# Patient Record
Sex: FEMALE | Race: OTHER | HISPANIC OR LATINO | URBAN - METROPOLITAN AREA
[De-identification: names, ages, dates, MRNs, and addresses within clinical notes are randomized per-mention and may not be internally consistent; named-entity substitution may affect disease eponyms.]

---

## 2017-11-06 ENCOUNTER — INPATIENT (INPATIENT)
Facility: HOSPITAL | Age: 32
LOS: 2 days | Discharge: HOME | End: 2017-11-09
Attending: OBSTETRICS & GYNECOLOGY | Admitting: OBSTETRICS & GYNECOLOGY
Payer: MEDICAID

## 2017-11-06 VITALS — WEIGHT: 160.94 LBS | HEIGHT: 59 IN

## 2017-11-06 DIAGNOSIS — O26.899 OTHER SPECIFIED PREGNANCY RELATED CONDITIONS, UNSPECIFIED TRIMESTER: ICD-10-CM

## 2017-11-06 DIAGNOSIS — Z3A.00 WEEKS OF GESTATION OF PREGNANCY NOT SPECIFIED: ICD-10-CM

## 2017-11-06 LAB
ABO RH CONFIRMATION: SIGNIFICANT CHANGE UP
APTT BLD: 25 SEC — LOW (ref 27.5–37.4)
BASOPHILS # BLD AUTO: 0 K/UL — SIGNIFICANT CHANGE UP (ref 0–0.2)
BASOPHILS # BLD AUTO: 0 K/UL — SIGNIFICANT CHANGE UP (ref 0–0.2)
BASOPHILS # BLD AUTO: 0.1 K/UL — SIGNIFICANT CHANGE UP (ref 0–0.2)
BASOPHILS NFR BLD AUTO: 0.3 % — SIGNIFICANT CHANGE UP (ref 0–2)
BASOPHILS NFR BLD AUTO: 0.5 % — SIGNIFICANT CHANGE UP (ref 0–2)
BASOPHILS NFR BLD AUTO: 0.5 % — SIGNIFICANT CHANGE UP (ref 0–2)
EOSINOPHIL # BLD AUTO: 0 K/UL — SIGNIFICANT CHANGE UP (ref 0–0.5)
EOSINOPHIL # BLD AUTO: 0 K/UL — SIGNIFICANT CHANGE UP (ref 0–0.5)
EOSINOPHIL # BLD AUTO: 0.1 K/UL — SIGNIFICANT CHANGE UP (ref 0–0.5)
EOSINOPHIL NFR BLD AUTO: 0.1 % — SIGNIFICANT CHANGE UP (ref 0–6)
EOSINOPHIL NFR BLD AUTO: 0.2 % — SIGNIFICANT CHANGE UP (ref 0–6)
EOSINOPHIL NFR BLD AUTO: 0.8 % — SIGNIFICANT CHANGE UP (ref 0–6)
FIBRINOGEN PPP-MCNC: 507 MG/DL — SIGNIFICANT CHANGE UP (ref 310–510)
HBV SURFACE AG SERPL QL IA: SIGNIFICANT CHANGE UP
HCT VFR BLD CALC: 27.8 % — LOW (ref 34.5–45)
HCT VFR BLD CALC: 33.6 % — LOW (ref 34.5–45)
HCT VFR BLD CALC: 38.6 % — SIGNIFICANT CHANGE UP (ref 34.5–45)
HGB BLD-MCNC: 10.7 G/DL — LOW (ref 11.5–15.5)
HGB BLD-MCNC: 12.1 G/DL — SIGNIFICANT CHANGE UP (ref 11.5–15.5)
HGB BLD-MCNC: 9.1 G/DL — LOW (ref 11.5–15.5)
HIV 1 & 2 AB SERPL IA.RAPID: SIGNIFICANT CHANGE UP
HIV 1+2 AB+HIV1 P24 AG SERPL QL IA: SIGNIFICANT CHANGE UP
INR BLD: 0.98 RATIO — SIGNIFICANT CHANGE UP (ref 0.88–1.16)
LYMPHOCYTES # BLD AUTO: 1.2 K/UL — SIGNIFICANT CHANGE UP (ref 1–3.3)
LYMPHOCYTES # BLD AUTO: 1.7 K/UL — SIGNIFICANT CHANGE UP (ref 1–3.3)
LYMPHOCYTES # BLD AUTO: 1.8 K/UL — SIGNIFICANT CHANGE UP (ref 1–3.3)
LYMPHOCYTES # BLD AUTO: 14.3 % — SIGNIFICANT CHANGE UP (ref 13–44)
LYMPHOCYTES # BLD AUTO: 18.7 % — SIGNIFICANT CHANGE UP (ref 13–44)
LYMPHOCYTES # BLD AUTO: 7.2 % — LOW (ref 13–44)
MCHC RBC-ENTMCNC: 29.7 PG — SIGNIFICANT CHANGE UP (ref 27–34)
MCHC RBC-ENTMCNC: 30.4 PG — SIGNIFICANT CHANGE UP (ref 27–34)
MCHC RBC-ENTMCNC: 30.5 PG — SIGNIFICANT CHANGE UP (ref 27–34)
MCHC RBC-ENTMCNC: 31.4 GM/DL — LOW (ref 32–36)
MCHC RBC-ENTMCNC: 31.8 GM/DL — LOW (ref 32–36)
MCHC RBC-ENTMCNC: 32.6 GM/DL — SIGNIFICANT CHANGE UP (ref 32–36)
MCV RBC AUTO: 93.4 FL — SIGNIFICANT CHANGE UP (ref 80–100)
MCV RBC AUTO: 94.6 FL — SIGNIFICANT CHANGE UP (ref 80–100)
MCV RBC AUTO: 95.8 FL — SIGNIFICANT CHANGE UP (ref 80–100)
MONOCYTES # BLD AUTO: 0.5 K/UL — SIGNIFICANT CHANGE UP (ref 0–0.9)
MONOCYTES NFR BLD AUTO: 3.1 % — SIGNIFICANT CHANGE UP (ref 2–14)
MONOCYTES NFR BLD AUTO: 4.3 % — SIGNIFICANT CHANGE UP (ref 2–14)
MONOCYTES NFR BLD AUTO: 5.8 % — SIGNIFICANT CHANGE UP (ref 2–14)
NEUTROPHILS # BLD AUTO: 14.4 K/UL — HIGH (ref 1.8–7.4)
NEUTROPHILS # BLD AUTO: 7 K/UL — SIGNIFICANT CHANGE UP (ref 1.8–7.4)
NEUTROPHILS # BLD AUTO: 9.9 K/UL — HIGH (ref 1.8–7.4)
NEUTROPHILS NFR BLD AUTO: 74.3 % — SIGNIFICANT CHANGE UP (ref 43–77)
NEUTROPHILS NFR BLD AUTO: 80.7 % — HIGH (ref 43–77)
NEUTROPHILS NFR BLD AUTO: 89.3 % — HIGH (ref 43–77)
PLATELET # BLD AUTO: 209 K/UL — SIGNIFICANT CHANGE UP (ref 150–400)
PLATELET # BLD AUTO: 218 K/UL — SIGNIFICANT CHANGE UP (ref 150–400)
PLATELET # BLD AUTO: 234 K/UL — SIGNIFICANT CHANGE UP (ref 150–400)
PROTHROM AB SERPL-ACNC: 10.7 SEC — SIGNIFICANT CHANGE UP (ref 9.8–12.7)
RBC # BLD: 2.98 M/UL — LOW (ref 3.8–5.2)
RBC # BLD: 3.51 M/UL — LOW (ref 3.8–5.2)
RBC # BLD: 4.08 M/UL — SIGNIFICANT CHANGE UP (ref 3.8–5.2)
RBC # FLD: 13 % — SIGNIFICANT CHANGE UP (ref 10.3–14.5)
RBC # FLD: 13.1 % — SIGNIFICANT CHANGE UP (ref 10.3–14.5)
RBC # FLD: 13.7 % — SIGNIFICANT CHANGE UP (ref 10.3–14.5)
RUBV IGG SER-ACNC: 2.4 INDEX — SIGNIFICANT CHANGE UP
RUBV IGG SER-IMP: POSITIVE — SIGNIFICANT CHANGE UP
T PALLIDUM AB TITR SER: NEGATIVE — SIGNIFICANT CHANGE UP
WBC # BLD: 12.2 K/UL — HIGH (ref 3.8–10.5)
WBC # BLD: 16.2 K/UL — HIGH (ref 3.8–10.5)
WBC # BLD: 9.4 K/UL — SIGNIFICANT CHANGE UP (ref 3.8–10.5)
WBC # FLD AUTO: 12.2 K/UL — HIGH (ref 3.8–10.5)
WBC # FLD AUTO: 16.2 K/UL — HIGH (ref 3.8–10.5)
WBC # FLD AUTO: 9.4 K/UL — SIGNIFICANT CHANGE UP (ref 3.8–10.5)

## 2017-11-06 PROCEDURE — 59514 CESAREAN DELIVERY ONLY: CPT | Mod: U9

## 2017-11-06 PROCEDURE — 88307 TISSUE EXAM BY PATHOLOGIST: CPT | Mod: 26

## 2017-11-06 PROCEDURE — 88302 TISSUE EXAM BY PATHOLOGIST: CPT | Mod: 26

## 2017-11-06 PROCEDURE — 58611 LIGATE OVIDUCT(S) ADD-ON: CPT

## 2017-11-06 RX ORDER — DIPHENHYDRAMINE HCL 50 MG
25 CAPSULE ORAL EVERY 6 HOURS
Qty: 0 | Refills: 0 | Status: DISCONTINUED | OUTPATIENT
Start: 2017-11-06 | End: 2017-11-09

## 2017-11-06 RX ORDER — AMPICILLIN TRIHYDRATE 250 MG
1 CAPSULE ORAL EVERY 6 HOURS
Qty: 0 | Refills: 0 | Status: DISCONTINUED | OUTPATIENT
Start: 2017-11-06 | End: 2017-11-07

## 2017-11-06 RX ORDER — SODIUM CHLORIDE 9 MG/ML
1000 INJECTION, SOLUTION INTRAVENOUS
Qty: 0 | Refills: 0 | Status: DISCONTINUED | OUTPATIENT
Start: 2017-11-06 | End: 2017-11-09

## 2017-11-06 RX ORDER — GENTAMICIN SULFATE 40 MG/ML
80 VIAL (ML) INJECTION ONCE
Qty: 0 | Refills: 0 | Status: COMPLETED | OUTPATIENT
Start: 2017-11-06 | End: 2017-11-06

## 2017-11-06 RX ORDER — OXYTOCIN 10 UNIT/ML
41.67 VIAL (ML) INJECTION
Qty: 20 | Refills: 0 | Status: DISCONTINUED | OUTPATIENT
Start: 2017-11-06 | End: 2017-11-09

## 2017-11-06 RX ORDER — AMPICILLIN TRIHYDRATE 250 MG
2 CAPSULE ORAL ONCE
Qty: 0 | Refills: 0 | Status: COMPLETED | OUTPATIENT
Start: 2017-11-06 | End: 2017-11-06

## 2017-11-06 RX ORDER — TETANUS TOXOID, REDUCED DIPHTHERIA TOXOID AND ACELLULAR PERTUSSIS VACCINE, ADSORBED 5; 2.5; 8; 8; 2.5 [IU]/.5ML; [IU]/.5ML; UG/.5ML; UG/.5ML; UG/.5ML
0.5 SUSPENSION INTRAMUSCULAR ONCE
Qty: 0 | Refills: 0 | Status: DISCONTINUED | OUTPATIENT
Start: 2017-11-06 | End: 2017-11-09

## 2017-11-06 RX ORDER — SODIUM CHLORIDE 9 MG/ML
1000 INJECTION, SOLUTION INTRAVENOUS ONCE
Qty: 0 | Refills: 0 | Status: DISCONTINUED | OUTPATIENT
Start: 2017-11-06 | End: 2017-11-09

## 2017-11-06 RX ORDER — GENTAMICIN SULFATE 40 MG/ML
VIAL (ML) INJECTION
Qty: 0 | Refills: 0 | Status: DISCONTINUED | OUTPATIENT
Start: 2017-11-06 | End: 2017-11-07

## 2017-11-06 RX ORDER — GLYCERIN ADULT
1 SUPPOSITORY, RECTAL RECTAL AT BEDTIME
Qty: 0 | Refills: 0 | Status: DISCONTINUED | OUTPATIENT
Start: 2017-11-06 | End: 2017-11-09

## 2017-11-06 RX ORDER — CITRIC ACID/SODIUM CITRATE 300-500 MG
30 SOLUTION, ORAL ORAL ONCE
Qty: 0 | Refills: 0 | Status: COMPLETED | OUTPATIENT
Start: 2017-11-06 | End: 2017-11-06

## 2017-11-06 RX ORDER — MORPHINE SULFATE 50 MG/1
4 CAPSULE, EXTENDED RELEASE ORAL EVERY 4 HOURS
Qty: 0 | Refills: 0 | Status: DISCONTINUED | OUTPATIENT
Start: 2017-11-06 | End: 2017-11-08

## 2017-11-06 RX ORDER — FERROUS SULFATE 325(65) MG
325 TABLET ORAL DAILY
Qty: 0 | Refills: 0 | Status: DISCONTINUED | OUTPATIENT
Start: 2017-11-06 | End: 2017-11-07

## 2017-11-06 RX ORDER — KETOROLAC TROMETHAMINE 30 MG/ML
30 SYRINGE (ML) INJECTION EVERY 6 HOURS
Qty: 0 | Refills: 0 | Status: DISCONTINUED | OUTPATIENT
Start: 2017-11-06 | End: 2017-11-08

## 2017-11-06 RX ORDER — DOCUSATE SODIUM 100 MG
100 CAPSULE ORAL
Qty: 0 | Refills: 0 | Status: DISCONTINUED | OUTPATIENT
Start: 2017-11-06 | End: 2017-11-09

## 2017-11-06 RX ORDER — ENOXAPARIN SODIUM 100 MG/ML
40 INJECTION SUBCUTANEOUS
Qty: 0 | Refills: 0 | Status: COMPLETED | OUTPATIENT
Start: 2017-11-06 | End: 2017-11-08

## 2017-11-06 RX ORDER — METOCLOPRAMIDE HCL 10 MG
10 TABLET ORAL ONCE
Qty: 0 | Refills: 0 | Status: DISCONTINUED | OUTPATIENT
Start: 2017-11-06 | End: 2017-11-09

## 2017-11-06 RX ORDER — LANOLIN
1 OINTMENT (GRAM) TOPICAL
Qty: 0 | Refills: 0 | Status: DISCONTINUED | OUTPATIENT
Start: 2017-11-06 | End: 2017-11-09

## 2017-11-06 RX ORDER — ACETAMINOPHEN 500 MG
1000 TABLET ORAL ONCE
Qty: 0 | Refills: 0 | Status: COMPLETED | OUTPATIENT
Start: 2017-11-06 | End: 2017-11-06

## 2017-11-06 RX ORDER — HYDROMORPHONE HYDROCHLORIDE 2 MG/ML
0.5 INJECTION INTRAMUSCULAR; INTRAVENOUS; SUBCUTANEOUS
Qty: 0 | Refills: 0 | Status: DISCONTINUED | OUTPATIENT
Start: 2017-11-06 | End: 2017-11-06

## 2017-11-06 RX ORDER — SIMETHICONE 80 MG/1
80 TABLET, CHEWABLE ORAL EVERY 4 HOURS
Qty: 0 | Refills: 0 | Status: DISCONTINUED | OUTPATIENT
Start: 2017-11-06 | End: 2017-11-09

## 2017-11-06 RX ORDER — AMPICILLIN TRIHYDRATE 250 MG
CAPSULE ORAL
Qty: 0 | Refills: 0 | Status: DISCONTINUED | OUTPATIENT
Start: 2017-11-06 | End: 2017-11-07

## 2017-11-06 RX ORDER — CEFAZOLIN SODIUM 1 G
2000 VIAL (EA) INJECTION ONCE
Qty: 0 | Refills: 0 | Status: COMPLETED | OUTPATIENT
Start: 2017-11-06 | End: 2017-11-06

## 2017-11-06 RX ORDER — GENTAMICIN SULFATE 40 MG/ML
80 VIAL (ML) INJECTION EVERY 8 HOURS
Qty: 0 | Refills: 0 | Status: DISCONTINUED | OUTPATIENT
Start: 2017-11-06 | End: 2017-11-07

## 2017-11-06 RX ORDER — ACETAMINOPHEN 500 MG
650 TABLET ORAL EVERY 6 HOURS
Qty: 0 | Refills: 0 | Status: DISCONTINUED | OUTPATIENT
Start: 2017-11-06 | End: 2017-11-09

## 2017-11-06 RX ADMIN — HYDROMORPHONE HYDROCHLORIDE 0.5 MILLIGRAM(S): 2 INJECTION INTRAMUSCULAR; INTRAVENOUS; SUBCUTANEOUS at 10:51

## 2017-11-06 RX ADMIN — Medication 400 MILLIGRAM(S): at 10:03

## 2017-11-06 RX ADMIN — Medication 30 MILLIGRAM(S): at 14:32

## 2017-11-06 RX ADMIN — Medication 100 MILLIGRAM(S): at 08:05

## 2017-11-06 RX ADMIN — HYDROMORPHONE HYDROCHLORIDE 0.5 MILLIGRAM(S): 2 INJECTION INTRAMUSCULAR; INTRAVENOUS; SUBCUTANEOUS at 09:59

## 2017-11-06 RX ADMIN — Medication 200 MILLIGRAM(S): at 10:06

## 2017-11-06 RX ADMIN — Medication 200 MILLIGRAM(S): at 18:46

## 2017-11-06 RX ADMIN — Medication 216 GRAM(S): at 12:19

## 2017-11-06 RX ADMIN — Medication 208 GRAM(S): at 18:06

## 2017-11-06 RX ADMIN — HYDROMORPHONE HYDROCHLORIDE 0.5 MILLIGRAM(S): 2 INJECTION INTRAMUSCULAR; INTRAVENOUS; SUBCUTANEOUS at 11:55

## 2017-11-06 RX ADMIN — Medication 100 MILLIGRAM(S): at 18:38

## 2017-11-06 RX ADMIN — HYDROMORPHONE HYDROCHLORIDE 0.5 MILLIGRAM(S): 2 INJECTION INTRAMUSCULAR; INTRAVENOUS; SUBCUTANEOUS at 11:12

## 2017-11-06 RX ADMIN — Medication 30 MILLIGRAM(S): at 23:20

## 2017-11-06 RX ADMIN — Medication 30 MILLIGRAM(S): at 22:52

## 2017-11-06 RX ADMIN — MORPHINE SULFATE 4 MILLIGRAM(S): 50 CAPSULE, EXTENDED RELEASE ORAL at 18:42

## 2017-11-06 RX ADMIN — HYDROMORPHONE HYDROCHLORIDE 0.5 MILLIGRAM(S): 2 INJECTION INTRAMUSCULAR; INTRAVENOUS; SUBCUTANEOUS at 10:52

## 2017-11-06 RX ADMIN — HYDROMORPHONE HYDROCHLORIDE 0.5 MILLIGRAM(S): 2 INJECTION INTRAMUSCULAR; INTRAVENOUS; SUBCUTANEOUS at 10:15

## 2017-11-06 RX ADMIN — HYDROMORPHONE HYDROCHLORIDE 0.5 MILLIGRAM(S): 2 INJECTION INTRAMUSCULAR; INTRAVENOUS; SUBCUTANEOUS at 10:26

## 2017-11-06 RX ADMIN — Medication 100 MILLIGRAM(S): at 11:08

## 2017-11-06 RX ADMIN — ENOXAPARIN SODIUM 40 MILLIGRAM(S): 100 INJECTION SUBCUTANEOUS at 21:22

## 2017-11-06 RX ADMIN — MORPHINE SULFATE 4 MILLIGRAM(S): 50 CAPSULE, EXTENDED RELEASE ORAL at 18:12

## 2017-11-06 RX ADMIN — Medication 125 MILLIUNIT(S)/MIN: at 09:37

## 2017-11-06 RX ADMIN — Medication 1000 MILLIGRAM(S): at 10:15

## 2017-11-06 RX ADMIN — Medication 30 MILLIGRAM(S): at 14:02

## 2017-11-07 DIAGNOSIS — D62 ACUTE POSTHEMORRHAGIC ANEMIA: ICD-10-CM

## 2017-11-07 LAB
BASOPHILS # BLD AUTO: 0 K/UL — SIGNIFICANT CHANGE UP (ref 0–0.2)
BASOPHILS NFR BLD AUTO: 0.2 % — SIGNIFICANT CHANGE UP (ref 0–2)
EOSINOPHIL # BLD AUTO: 0.1 K/UL — SIGNIFICANT CHANGE UP (ref 0–0.5)
EOSINOPHIL NFR BLD AUTO: 0.6 % — SIGNIFICANT CHANGE UP (ref 0–6)
HCT VFR BLD CALC: 26.6 % — LOW (ref 34.5–45)
HGB BLD-MCNC: 8.5 G/DL — LOW (ref 11.5–15.5)
LYMPHOCYTES # BLD AUTO: 1.5 K/UL — SIGNIFICANT CHANGE UP (ref 1–3.3)
LYMPHOCYTES # BLD AUTO: 11 % — LOW (ref 13–44)
MCHC RBC-ENTMCNC: 29.4 PG — SIGNIFICANT CHANGE UP (ref 27–34)
MCHC RBC-ENTMCNC: 32 GM/DL — SIGNIFICANT CHANGE UP (ref 32–36)
MCV RBC AUTO: 91.6 FL — SIGNIFICANT CHANGE UP (ref 80–100)
MONOCYTES # BLD AUTO: 0.7 K/UL — SIGNIFICANT CHANGE UP (ref 0–0.9)
MONOCYTES NFR BLD AUTO: 5.1 % — SIGNIFICANT CHANGE UP (ref 2–14)
NEUTROPHILS # BLD AUTO: 11.5 K/UL — HIGH (ref 1.8–7.4)
NEUTROPHILS NFR BLD AUTO: 83 % — HIGH (ref 43–77)
PLATELET # BLD AUTO: 220 K/UL — SIGNIFICANT CHANGE UP (ref 150–400)
RBC # BLD: 2.9 M/UL — LOW (ref 3.8–5.2)
RBC # FLD: 13 % — SIGNIFICANT CHANGE UP (ref 10.3–14.5)
WBC # BLD: 13.9 K/UL — HIGH (ref 3.8–10.5)
WBC # FLD AUTO: 13.9 K/UL — HIGH (ref 3.8–10.5)

## 2017-11-07 RX ORDER — ACETAMINOPHEN 500 MG
1000 TABLET ORAL ONCE
Qty: 0 | Refills: 0 | Status: COMPLETED | OUTPATIENT
Start: 2017-11-07 | End: 2017-11-07

## 2017-11-07 RX ORDER — IRON SUCROSE 20 MG/ML
200 INJECTION, SOLUTION INTRAVENOUS EVERY 24 HOURS
Qty: 0 | Refills: 0 | Status: COMPLETED | OUTPATIENT
Start: 2017-11-07 | End: 2017-11-09

## 2017-11-07 RX ADMIN — Medication 200 MILLIGRAM(S): at 02:03

## 2017-11-07 RX ADMIN — Medication 1000 MILLIGRAM(S): at 04:20

## 2017-11-07 RX ADMIN — Medication 100 MILLIGRAM(S): at 02:02

## 2017-11-07 RX ADMIN — Medication 30 MILLIGRAM(S): at 20:02

## 2017-11-07 RX ADMIN — Medication 30 MILLIGRAM(S): at 20:32

## 2017-11-07 RX ADMIN — Medication 30 MILLIGRAM(S): at 14:41

## 2017-11-07 RX ADMIN — Medication 30 MILLIGRAM(S): at 08:00

## 2017-11-07 RX ADMIN — SIMETHICONE 80 MILLIGRAM(S): 80 TABLET, CHEWABLE ORAL at 23:29

## 2017-11-07 RX ADMIN — Medication 100 MILLIGRAM(S): at 06:25

## 2017-11-07 RX ADMIN — IRON SUCROSE 110 MILLIGRAM(S): 20 INJECTION, SOLUTION INTRAVENOUS at 11:49

## 2017-11-07 RX ADMIN — Medication 208 GRAM(S): at 00:40

## 2017-11-07 RX ADMIN — ENOXAPARIN SODIUM 40 MILLIGRAM(S): 100 INJECTION SUBCUTANEOUS at 21:36

## 2017-11-07 RX ADMIN — SIMETHICONE 80 MILLIGRAM(S): 80 TABLET, CHEWABLE ORAL at 13:20

## 2017-11-07 RX ADMIN — SIMETHICONE 80 MILLIGRAM(S): 80 TABLET, CHEWABLE ORAL at 06:26

## 2017-11-07 RX ADMIN — Medication 1 TABLET(S): at 11:50

## 2017-11-07 RX ADMIN — Medication 30 MILLIGRAM(S): at 08:32

## 2017-11-07 RX ADMIN — Medication 100 MILLIGRAM(S): at 12:00

## 2017-11-07 RX ADMIN — Medication 100 MILLIGRAM(S): at 20:06

## 2017-11-07 RX ADMIN — Medication 208 GRAM(S): at 12:00

## 2017-11-07 RX ADMIN — Medication 200 MILLIGRAM(S): at 10:41

## 2017-11-07 RX ADMIN — Medication 30 MILLIGRAM(S): at 15:10

## 2017-11-07 RX ADMIN — Medication 400 MILLIGRAM(S): at 03:48

## 2017-11-07 NOTE — CHART NOTE - NSCHARTNOTEFT_GEN_A_CORE
Patient seen at bedside, comfortable, states she feeling 5/10 pain worse when getting up and walking, reports mild weakness but no dizziness/lightheadedness.   Denies n/v/d, heavy vaginal bleeding, abdominal pain or any other concerns  EKG: Sinus tachycardia   Orthostatics positive    Vital Signs Last 24 Hrs    T(C): 36.6 (07 Nov 2017 14:10), Max: 36.8 (07 Nov 2017 00:03)  T(F): 97.8 (07 Nov 2017 14:10), Max: 98.2 (07 Nov 2017 00:03)  HR: 112 (07 Nov 2017 14:10) (103 - 112)  BP: 122/61 (07 Nov 2017 14:10) (109/67 - 124/72)  RR: 17 (07 Nov 2017 14:10) (16 - 18)  SpO2: 97% (07 Nov 2017 14:10) (97% - 99%)    Discussed the need for blood transfusion given significant drop in Hgb, patient agrees to transfusion; consent signed    a/p : POD 1 s/p primary c/s with BTL with acute blood loss anemia   transfuse 2 units   monitor vital signs   cbc 4 hours post transfusion   continue close monitoring  asuncion kruse hleap, attending on call

## 2017-11-08 LAB
HCT VFR BLD CALC: 29 % — LOW (ref 34.5–45)
HGB BLD-MCNC: 9.5 G/DL — LOW (ref 11.5–15.5)
MCHC RBC-ENTMCNC: 30.9 PG — SIGNIFICANT CHANGE UP (ref 27–34)
MCHC RBC-ENTMCNC: 32.9 GM/DL — SIGNIFICANT CHANGE UP (ref 32–36)
MCV RBC AUTO: 93.8 FL — SIGNIFICANT CHANGE UP (ref 80–100)
PLATELET # BLD AUTO: 225 K/UL — SIGNIFICANT CHANGE UP (ref 150–400)
RBC # BLD: 3.09 M/UL — LOW (ref 3.8–5.2)
RBC # FLD: 14 % — SIGNIFICANT CHANGE UP (ref 10.3–14.5)
WBC # BLD: 16.5 K/UL — HIGH (ref 3.8–10.5)
WBC # FLD AUTO: 16.5 K/UL — HIGH (ref 3.8–10.5)

## 2017-11-08 RX ORDER — OXYCODONE AND ACETAMINOPHEN 5; 325 MG/1; MG/1
1 TABLET ORAL EVERY 6 HOURS
Qty: 0 | Refills: 0 | Status: DISCONTINUED | OUTPATIENT
Start: 2017-11-08 | End: 2017-11-09

## 2017-11-08 RX ORDER — IBUPROFEN 200 MG
600 TABLET ORAL EVERY 6 HOURS
Qty: 0 | Refills: 0 | Status: DISCONTINUED | OUTPATIENT
Start: 2017-11-08 | End: 2017-11-09

## 2017-11-08 RX ORDER — OXYCODONE AND ACETAMINOPHEN 5; 325 MG/1; MG/1
2 TABLET ORAL EVERY 6 HOURS
Qty: 0 | Refills: 0 | Status: DISCONTINUED | OUTPATIENT
Start: 2017-11-08 | End: 2017-11-09

## 2017-11-08 RX ADMIN — OXYCODONE AND ACETAMINOPHEN 1 TABLET(S): 5; 325 TABLET ORAL at 14:38

## 2017-11-08 RX ADMIN — Medication 100 MILLIGRAM(S): at 06:37

## 2017-11-08 RX ADMIN — Medication 1 TABLET(S): at 11:13

## 2017-11-08 RX ADMIN — SIMETHICONE 80 MILLIGRAM(S): 80 TABLET, CHEWABLE ORAL at 05:11

## 2017-11-08 RX ADMIN — ENOXAPARIN SODIUM 40 MILLIGRAM(S): 100 INJECTION SUBCUTANEOUS at 23:59

## 2017-11-08 RX ADMIN — SIMETHICONE 80 MILLIGRAM(S): 80 TABLET, CHEWABLE ORAL at 11:14

## 2017-11-08 RX ADMIN — IRON SUCROSE 110 MILLIGRAM(S): 20 INJECTION, SOLUTION INTRAVENOUS at 11:14

## 2017-11-08 RX ADMIN — Medication 30 MILLIGRAM(S): at 05:31

## 2017-11-08 RX ADMIN — Medication 100 MILLIGRAM(S): at 17:32

## 2017-11-08 RX ADMIN — Medication 30 MILLIGRAM(S): at 05:01

## 2017-11-08 RX ADMIN — OXYCODONE AND ACETAMINOPHEN 2 TABLET(S): 5; 325 TABLET ORAL at 22:00

## 2017-11-08 RX ADMIN — OXYCODONE AND ACETAMINOPHEN 2 TABLET(S): 5; 325 TABLET ORAL at 21:22

## 2017-11-08 RX ADMIN — OXYCODONE AND ACETAMINOPHEN 1 TABLET(S): 5; 325 TABLET ORAL at 15:10

## 2017-11-09 VITALS
DIASTOLIC BLOOD PRESSURE: 67 MMHG | OXYGEN SATURATION: 100 % | RESPIRATION RATE: 18 BRPM | HEART RATE: 98 BPM | TEMPERATURE: 98 F | SYSTOLIC BLOOD PRESSURE: 114 MMHG

## 2017-11-09 PROCEDURE — G0463: CPT

## 2017-11-09 PROCEDURE — 85384 FIBRINOGEN ACTIVITY: CPT

## 2017-11-09 PROCEDURE — P9059: CPT

## 2017-11-09 PROCEDURE — 36415 COLL VENOUS BLD VENIPUNCTURE: CPT

## 2017-11-09 PROCEDURE — 85730 THROMBOPLASTIN TIME PARTIAL: CPT

## 2017-11-09 PROCEDURE — 87340 HEPATITIS B SURFACE AG IA: CPT

## 2017-11-09 PROCEDURE — 88302 TISSUE EXAM BY PATHOLOGIST: CPT

## 2017-11-09 PROCEDURE — 86920 COMPATIBILITY TEST SPIN: CPT

## 2017-11-09 PROCEDURE — 88307 TISSUE EXAM BY PATHOLOGIST: CPT

## 2017-11-09 PROCEDURE — P9040: CPT

## 2017-11-09 PROCEDURE — 59050 FETAL MONITOR W/REPORT: CPT

## 2017-11-09 PROCEDURE — 86780 TREPONEMA PALLIDUM: CPT

## 2017-11-09 PROCEDURE — 85027 COMPLETE CBC AUTOMATED: CPT

## 2017-11-09 PROCEDURE — 86762 RUBELLA ANTIBODY: CPT

## 2017-11-09 PROCEDURE — 86703 HIV-1/HIV-2 1 RESULT ANTBDY: CPT

## 2017-11-09 PROCEDURE — 86850 RBC ANTIBODY SCREEN: CPT

## 2017-11-09 PROCEDURE — 86901 BLOOD TYPING SEROLOGIC RH(D): CPT

## 2017-11-09 PROCEDURE — 85610 PROTHROMBIN TIME: CPT

## 2017-11-09 PROCEDURE — 36430 TRANSFUSION BLD/BLD COMPNT: CPT

## 2017-11-09 PROCEDURE — 87389 HIV-1 AG W/HIV-1&-2 AB AG IA: CPT

## 2017-11-09 PROCEDURE — 93005 ELECTROCARDIOGRAM TRACING: CPT

## 2017-11-09 PROCEDURE — 59025 FETAL NON-STRESS TEST: CPT

## 2017-11-09 RX ORDER — FERROUS SULFATE 325(65) MG
1 TABLET ORAL
Qty: 60 | Refills: 0 | OUTPATIENT
Start: 2017-11-09 | End: 2017-12-09

## 2017-11-09 RX ORDER — IBUPROFEN 200 MG
1 TABLET ORAL
Qty: 20 | Refills: 0 | OUTPATIENT
Start: 2017-11-09 | End: 2017-11-14

## 2017-11-09 RX ORDER — DOCUSATE SODIUM 100 MG
1 CAPSULE ORAL
Qty: 40 | Refills: 0 | OUTPATIENT
Start: 2017-11-09 | End: 2017-11-29

## 2017-11-09 RX ORDER — SENNA PLUS 8.6 MG/1
2 TABLET ORAL
Qty: 20 | Refills: 0 | OUTPATIENT
Start: 2017-11-09 | End: 2017-11-19

## 2017-11-09 RX ORDER — SIMETHICONE 80 MG/1
1 TABLET, CHEWABLE ORAL
Qty: 30 | Refills: 0 | OUTPATIENT
Start: 2017-11-09 | End: 2017-11-14

## 2017-11-09 RX ADMIN — IRON SUCROSE 110 MILLIGRAM(S): 20 INJECTION, SOLUTION INTRAVENOUS at 12:30

## 2017-11-09 RX ADMIN — Medication 1 TABLET(S): at 12:58

## 2017-11-09 NOTE — DISCHARGE NOTE OB - MEDICATION SUMMARY - MEDICATIONS TO TAKE
I will START or STAY ON the medications listed below when I get home from the hospital:    ibuprofen 600 mg oral tablet  -- 1 tab(s) by mouth every 6 hours, As needed, mild pain  -- Indication: For Pain, as needed    FeroSul 325 mg (65 mg elemental iron) oral tablet  -- 1 tab(s) by mouth 2 times a day   -- Check with your doctor before becoming pregnant.  Do not chew, break, or crush.  May discolor urine or feces.    -- Indication: For anemia    senna 15 mg oral tablet  -- 2 tab(s) by mouth once a day (at bedtime)   -- Indication: For Constipation    docusate sodium 100 mg oral capsule  -- 1 cap(s) by mouth 2 times a day  -- Indication: For stool softener    simethicone 80 mg oral tablet, chewable  -- 1 tab(s) by mouth every 4 hours, As needed, Gas  -- Indication: For gas pain

## 2017-11-09 NOTE — PROGRESS NOTE ADULT - PROBLEM SELECTOR PROBLEM 1
delivery indicated due to breech presentation

## 2017-11-09 NOTE — PROGRESS NOTE ADULT - SUBJECTIVE AND OBJECTIVE BOX
Patient seen resting comfortably.  No new complaints. Reports incisional pain, controlled on pain medications.  Denies CP, SOB, N/V/D, dizziness, palpitations, Jerome removed in AM, due to void. Denies flatus or BM    Vital Signs Last 24 Hrs  T(C): 36.6 (07 Nov 2017 04:51), Max: 36.8 (06 Nov 2017 13:53)  T(F): 97.8 (07 Nov 2017 04:51), Max: 98.2 (06 Nov 2017 13:53)  HR: 103 (07 Nov 2017 04:51) (71 - 104)  BP: 116/62 (07 Nov 2017 04:51) (109/67 - 134/76)  BP(mean): 76 (06 Nov 2017 11:45) (72 - 88)  RR: 16 (07 Nov 2017 04:51) (11 - 18)  SpO2: 99% (07 Nov 2017 04:51) (97% - 100%)    Gen: A&O x 3, NAD  Chest: CTABL  Cardiac: S1+S2+ RRR  Breast: Soft, nontender, nonengorged  Abdomen: Nontender, nondistended, +BS, Incision c/d/i, no erythema  Gyn: lochia wnl  Extremities: Nontender, no worsening edema, venodynes in place                          8.5    13.9  )-----------( 220      ( 07 Nov 2017 06:33 )             26.6
Patient seen resting comfortably.  No new complaints. Reports incisional pain, controlled on pain medications.  Denies CP, SOB, N/V/D, dizziness, palpitations. Voiding spontaneously, denies dysuria, urgency or frequency. +Flatus, - BM. Tolerating regular diet.     Vital Signs Last 24 Hrs  T(C): 36.6 (09 Nov 2017 06:37), Max: 36.7 (08 Nov 2017 14:38)  T(F): 97.9 (09 Nov 2017 06:37), Max: 98 (08 Nov 2017 14:38)  HR: 98 (09 Nov 2017 06:37) (90 - 112)  BP: 114/67 (09 Nov 2017 06:37) (110/60 - 124/73)  RR: 18 (09 Nov 2017 06:37) (16 - 18)  SpO2: 100% (09 Nov 2017 06:37) (98% - 100%)    Gen: A&O x 3, NAD  Chest: CTABL  Cardiac: S1+S2+ RRR  Breast: Soft, nontender, nonengorged  Abdomen: Nontender, nondistended, +BS, Incision c/d/i, no erythema; staples in place  Gyn: lochia wnl  Extremities: Nontender, no worsening edema                          9.5    16.5  )-----------( 225      ( 08 Nov 2017 00:36 )             29.0
[facility] offers no new complaints. + Ambulation, voiding without difficulty, + flatus; tolerating regular diet. both breast and bottle feeding. Denies HA, CP, SOB, N/V/D,  no bm; dizziness, palpitations, worsening abdominal pain, worsening vaginal bleeding, or any other concerns.   pt s/p one unit prbcs, and very hard stick. pt feels better. "becki becerraecks'  Vital Signs Last 24 Hrs  T(C): 36.4 (07 Nov 2017 17:49), Max: 36.8 (07 Nov 2017 00:03)  T(F): 97.6 (07 Nov 2017 17:49), Max: 98.2 (07 Nov 2017 00:03)  HR: 115 (07 Nov 2017 17:49) (103 - 120)  BP: 118/64 (07 Nov 2017 17:49) (109/67 - 124/72)  BP(mean): --  RR: 15 (07 Nov 2017 17:49) (15 - 17)  SpO2: 97% (07 Nov 2017 17:49) (97% - 99%)    Gen: A&O x 3, NAD  Chest: CTA B/L  Cardiac: S1,S2  RRR  Breast: Soft, nontender, nonengorged  Abdomen: +BS; soft; Nontender, nondistended; dressing removed incision C/D/I  Gyn: minimal lochia   Extremities: Nontender, no worsening edema;                           8.5    13.9  )-----------( 220      ( 07 Nov 2017 06:33 )             26.6       A/P: POD #1 s/p c/s with postoperative anemia, s/p one unit prbcs, will follow cbc in 4 horus and reevalaute. will hold off 2nd unit  iv venofur ordered  --Pain management as needed  -Advance as per protocol  -OOB and ambulate  -f/u Rpt CBC   - -Advance diet with flatus  -Encourage breastfeeding   -d/w dr.spyropolous eaton
Patient seen at bedisde resting comfortably offers no new complaints. + Ambulation, + void without difficulty, - flatus; tolerating clear diet. bottle feeding. Pt s/p 1 unit blood transfusion overnightDenies HA, CP, SOB, N/V/D,  no bm; dizziness, palpitations, worsening abdominal pain, worsening vaginal bleeding, or any other concerns.   Vital Signs Last 24 Hrs  T(C): 36.8 (08 Nov 2017 06:25), Max: 36.8 (07 Nov 2017 21:00)  T(F): 98.2 (08 Nov 2017 06:25), Max: 98.2 (07 Nov 2017 21:00)  HR: 100 (08 Nov 2017 06:25) (100 - 120)  BP: 119/70 (08 Nov 2017 06:25) (116/61 - 125/69)  BP(mean): --  RR: 16 (08 Nov 2017 06:25) (15 - 18)  SpO2: 97% (08 Nov 2017 06:25) (97% - 99%)    Gen: A&O x 3, NAD  Chest: CTABL  Cardiac: S1+S2+ RRR  Breast: Soft, nontender, nonengorged  Abdomen: +BS; soft; Nontender, nondistended, fundus firm, Incision C/D/I steri strips in place   Gyn: Minimal lochia  Extremities: Nontender, DTRS 2+, no worsening edema                        9.5    16.5  )-----------( 225      ( 08 Nov 2017 00:36 )             29.0       A/P: 32 year old POD #2 s/p c/s, acute blood loss anemia s/p 1 unit PRBC, stable     -Pain management as needed  -cont post op care  -OOB and ambulate  -continue IV venofer   -encourage insentive spirometer use  -Encourage breastfeeding   -d/w dr. Kay
Patient seen at bedside, resting comfortably offers no new complaints.   Due to ambulate, mcconnell in place to be removed in PM. Draining adequate urine, clear.  Tolerating only ice chips at this time. Yet to attempt breastfeeding.   Denies HA, CP, SOB, N/V/D, dizziness, palpitations, worsening abdominal pain, worsening vaginal bleeding, or any other concerns.      Vital Signs Last 24 Hrs  T(C): 36.3 (06 Nov 2017 09:45), Max: 36.3 (06 Nov 2017 09:45)  T(F): 97.3 (06 Nov 2017 09:45), Max: 97.3 (06 Nov 2017 09:45)  HR: 71 (06 Nov 2017 11:45) (71 - 98)  BP: 111/64 (06 Nov 2017 11:45) (111/64 - 133/64)  BP(mean): 76 (06 Nov 2017 11:45) (72 - 88)  RR: 11 (06 Nov 2017 11:30) (11 - 18)  SpO2: 100% (06 Nov 2017 11:45) (99% - 100%)    Gen: A&O x 3, NAD  Chest: CTA B/L  Cardiac: S1, S2  RRR  Breast: Soft, nontender, nonengorged  Abdomen: +BS; soft; NT; ND; Dressing C/D/I  Gyn: Moderate lochia  Ext: Nontender, DTRS 2+, no worsening edema, venodynes intact                          10.7   16.2  )-----------( 218      ( 06 Nov 2017 10:52 )             33.6       d/w Dr. Martin

## 2017-11-09 NOTE — DISCHARGE NOTE OB - MATERIALS PROVIDED
Immunization Record/Breastfeeding Log/MMR Vaccination (VIS Pub Date: 2012)/Vaccinations/Breastfeeding Guide and Packet/Birth Certificate Instructions/[facility] [place] Screening Program/Breastfeeding Mother’s Support Group Information/Guide to Postpartum Care/Letter of Medical Neccessity/Back To Sleep Handout/Discharge Medication Information for Patients and Families Pocket Guide/Shaken Baby Prevention Handout/[facility] Hearing Screen Program

## 2017-11-09 NOTE — PROGRESS NOTE ADULT - PROBLEM SELECTOR PROBLEM 2
Postoperative anemia due to acute blood loss

## 2017-11-09 NOTE — DISCHARGE NOTE OB - HOSPITAL COURSE
Patient arrived in labor, fetus in breech position; s/p primary  section with BTL. Complicated by intra operative blood loss requiring 1 unit PRBC transfusion.   Staples removed post op day 3, clinically stable for discharge

## 2017-11-09 NOTE — DISCHARGE NOTE OB - PLAN OF CARE
pain management and breast feeding Continue breastfeeding.  Motrin as needed for pain.  Ambulate daily.  No heavy lifting or anything per vagina x 6 weeks - no sex, tampons, douching, tub baths, etc.  Follow up in office in 2 weeks for incision check, and then at 6 weeks for postpartum check. Continue to take iron twice daily, take stool softeners to avoid constipation. Eat foods rich in iron such as red meats and leafy greens

## 2017-11-09 NOTE — PROGRESS NOTE ADULT - ASSESSMENT
POD 1 s/p primary c/s at 39 weeks, footling breech in labor
POD 3 s/p primary c/s with BTL for breech; complicated by intraoperative blood loss s/p 1 unit PRBC
33yo female POD #0 s/p c/s for breech in labor
A/P: 32 year old POD #2 s/p c/s, acute blood loss anemia s/p 1 unit PRBC, stable
A/P: POD #1 s/p c/s with postoperative anemia, s/p one unit prbcs, will follow cbc in 4 horus and reevalaute. will hold off 2nd unit  iv venofur ordered  --Pain management as needed  -Advance as per protocol  -OOB and ambulate  -f/u Rpt CBC   - -Advance diet with flatus  -Encourage breastfeeding   -d/w dr.spyropolous eaton

## 2017-11-09 NOTE — DISCHARGE NOTE OB - PATIENT PORTAL LINK FT
“You can access the [employer] Patient Portal, offered by [facility], by registering with the following website: [email or web address]”

## 2017-11-09 NOTE — DISCHARGE NOTE OB - CARE PLAN
Principal Discharge DX:	 delivery indicated due to breech presentation  Goal:	pain management and breast feeding  Instructions for follow-up, activity and diet:	Continue breastfeeding.  Motrin as needed for pain.  Ambulate daily.  No heavy lifting or anything per vagina x 6 weeks - no sex, tampons, douching, tub baths, etc.  Follow up in office in 2 weeks for incision check, and then at 6 weeks for postpartum check.  Secondary Diagnosis:	Postoperative anemia due to acute blood loss  Instructions for follow-up, activity and diet:	Continue to take iron twice daily, take stool softeners to avoid constipation. Eat foods rich in iron such as red meats and leafy greens

## 2017-11-09 NOTE — PROGRESS NOTE ADULT - PROBLEM SELECTOR PLAN 1
- Pain management as needed  - Advance diet s/p flatus   - OOB and ambulate  - Incentive spirometry  - Repeat CBC   - Encourage breastfeeding
Discharge home, patient to follow up in 2 weeks for incision check  staples to be removed today prior to discharge as per Dr. Hamilton  pain management prn  encourage ambulation and breast feeding
-Pain management as needed  -OOB and ambulate after mcconnell removal  -f/u rpt cbc 6pm  -DC mcconnell f/u void 12hrs postop  -Advance diet with flatus  -Encourage breastfeeding   -venodynes, lovenox prophylaxis
-Pain management as needed  -cont post op care  -OOB and ambulate  -continue IV venofer   -encourage insentive spirometer use  -Encourage breastfeeding   -d/w dr. Kay
A/P: POD #1 s/p c/s with postoperative anemia, s/p one unit prbcs, will follow cbc in 4 horus and reevalaute. will hold off 2nd unit  --Pain management as needed  -Advance as per protocol  -OOB and ambulate  -f/u Rpt CBC   - -Advance diet with flatus  -Encourage breastfeeding   -d/w dr.spyropolous eaton

## 2017-11-09 NOTE — DISCHARGE NOTE OB - CARE PROVIDER_API CALL
Judith Worthington), Obstetrics and Gynecology  [address] [place] [address]  Phone: [phone]  Fax: [phone]

## 2017-11-09 NOTE — PROGRESS NOTE ADULT - PROBLEM SELECTOR PLAN 2
IV venofer for anemia  to be discharged home with iron and colace
-s/p 1 unit PRBC  -continue IV venofer
A/P: POD #1 s/p c/s with postoperative anemia, s/p one unit prbcs, will follow cbc in 4 horus and reevalaute. will hold off 2nd unit  --Pain management as needed  -Advance as per protocol  -OOB and ambulate  -f/u Rpt CBC   - -Advance diet with flatus  -Encourage breastfeeding   -d/w dr.spyropolous eaton

## 2019-05-28 NOTE — DISCHARGE NOTE OB - PRO FEEDING PLAN INFANT OB
Noted will wait for the fax.  Kit Toussaint,  For [employer] [facility]   breast and formula  feeding…

## 2020-05-27 NOTE — DISCHARGE NOTE OB - BREASTFEEDING PROVIDES STABLE TEMPERATURE THROUGH SKIN TO SKIN CONTACT
PRE-SEDATION ASSESSMENT    CONSENT  Consent for procedure and sedation obtained: Yes    MEDICAL HISTORY  Significant medical/surgical history: No  Past Complications with Sedation/Anesthesia: No  Significant Family History: No  Smoking History: No  Alcohol/Drug abuse: No  Possible Pregnancy (LMP): Not Applicable  Cardiac History: No  Respiratory History: Yes    PHYSICAL EXAM  History and Physical Reviewed: H&P completed today  Airway Risk History: Sleep apnea;No previous history  Airway Anatomy : Class II  Heart : Normal  Lungs : Normal  LOC/Mental Status : Normal    OTHER FINDINGS  Reviewed current medications and allergies: Yes  Pertinent lab/diagnostic test reviewed: Yes    SEDATION RISK ASSESSMENT  Risk Status ASA: Class II - Normal patient with mild systemic disease  Plan for Sedation: Minimal Sedation  Indications for Procedure/Pre-Procedure Diagnosis and Planned Procedure: abnormal CCTA  EKG Monitoring: Yes    NARRATIVE FINDINGS      Statement Selected